# Patient Record
Sex: FEMALE | Race: WHITE | Employment: OTHER | URBAN - METROPOLITAN AREA
[De-identification: names, ages, dates, MRNs, and addresses within clinical notes are randomized per-mention and may not be internally consistent; named-entity substitution may affect disease eponyms.]

---

## 2024-05-11 NOTE — H&P
58 year old female presents with c/o Back pain.          SULFA: JOINT SWELLING         PMH:         ANKYLOSING SPONDYLITIS  Anxiety  Depression    Psh:  Acdf [id number]  Cholecsytectomy  Hysterectomy  Deviated nasal septum  Total right hip  Knee arthroscopy    Sh:  Neg etoh  Ext tobacco use  Neg substance abuse      This note was created using voice recognition software and was not corrected for typographical or grammatical errors and may have unintended errors             I spent 60 minutes with the patient with over 50% for direct face to face counseling and coordination of care. Risks, benefits and alternatives to surgery were discussed:         Risks including bleeding, infection, temporary or permanent numbness, tingling, weakness, paralysis, bowel or bladder dysfunction, leaking of cerebrospinal fluid leading to meningitis, no pain improvement or worsening of pain, future staged surgery, seizure, stroke, blindness, heart attack, blood clot in legs, pulmonary embolism, coma, death, or other. Risk of recurrent or residual symptoms and scar tissue formation which cannot be improved by further surgery.         [Spinal hardware and instrumentation risks include: hardware failure or breaking of screws or rods or plates requiring revision or removal.         Nonfusion or pseudoarthrosis risk is higher with diabetes or tobacco use and may cause long term pain and require future revision, removal or other surgery.]         [Staged surgery a possibility including spinal cord stimulation for continuing pain in the back or legs after surgery.]         Benefit to include neurologic stabilization to that there is no further loss of function but without promise or guarantee of any improvement as any function already lost may be irreversible from longstanding injury or an injury that was too severe to allow recovery.         The goal of surgery would be to prevent you from becoming any worse and losing even more neurological

## 2024-05-13 LAB — NON-UH HIE PLATELET FUNCTION (ASPIRIN): 643 ARU

## 2024-05-15 RX ORDER — NABUMETONE 500 MG/1
500 TABLET, FILM COATED ORAL 2 TIMES DAILY
Status: ON HOLD | COMMUNITY
Start: 2021-07-12 | End: 2024-05-20 | Stop reason: HOSPADM

## 2024-05-15 RX ORDER — HYDROCODONE BITARTRATE AND ACETAMINOPHEN 5; 325 MG/1; MG/1
1 TABLET ORAL EVERY 12 HOURS
Status: ON HOLD | COMMUNITY
Start: 2024-01-02 | End: 2024-05-20 | Stop reason: HOSPADM

## 2024-05-15 RX ORDER — VENLAFAXINE 75 MG/1
75 TABLET ORAL DAILY
COMMUNITY
Start: 2023-12-21

## 2024-05-15 RX ORDER — PANTOPRAZOLE SODIUM 40 MG/1
40 TABLET, DELAYED RELEASE ORAL DAILY
COMMUNITY
Start: 2023-12-21

## 2024-05-15 RX ORDER — TRAZODONE HYDROCHLORIDE 150 MG/1
150 TABLET ORAL NIGHTLY
COMMUNITY
Start: 1993-02-22

## 2024-05-15 RX ORDER — CLONAZEPAM 0.5 MG/1
0.5 TABLET ORAL NIGHTLY PRN
COMMUNITY
Start: 2014-08-01 | End: 2024-05-26

## 2024-05-15 RX ORDER — LORATADINE 10 MG/1
10 TABLET ORAL DAILY
COMMUNITY
Start: 2024-01-02

## 2024-05-15 RX ORDER — ADALIMUMAB 40MG/0.4ML
KIT SUBCUTANEOUS
COMMUNITY
Start: 2023-12-12

## 2024-05-16 ENCOUNTER — ANESTHESIA EVENT (OUTPATIENT)
Dept: OPERATING ROOM | Age: 59
End: 2024-05-16
Payer: MEDICARE

## 2024-05-17 ENCOUNTER — HOSPITAL ENCOUNTER (INPATIENT)
Age: 59
LOS: 3 days | Discharge: HOME HEALTH | DRG: 455 | End: 2024-05-20
Attending: NEUROLOGICAL SURGERY | Admitting: NEUROLOGICAL SURGERY
Payer: MEDICARE

## 2024-05-17 ENCOUNTER — ANESTHESIA (OUTPATIENT)
Dept: OPERATING ROOM | Age: 59
End: 2024-05-17
Payer: MEDICARE

## 2024-05-17 ENCOUNTER — APPOINTMENT (OUTPATIENT)
Dept: GENERAL RADIOLOGY | Age: 59
DRG: 455 | End: 2024-05-17
Attending: NEUROLOGICAL SURGERY
Payer: MEDICARE

## 2024-05-17 DIAGNOSIS — M48.062 SPINAL STENOSIS OF LUMBAR REGION WITH NEUROGENIC CLAUDICATION: Primary | ICD-10-CM

## 2024-05-17 PROBLEM — M48.00 SPINAL STENOSIS: Status: ACTIVE | Noted: 2024-05-17

## 2024-05-17 LAB
ABO + RH BLD: NORMAL
BLD GP AB SCN SERPL QL: NORMAL
BLOOD GROUP ANTIBODIES SERPL: NORMAL
BLOOD GROUP ANTIBODIES SERPL: NORMAL
C (LITTLE) ANTIGEN: NORMAL
E (BIG) ANTIGEN: NORMAL

## 2024-05-17 PROCEDURE — 2720000010 HC SURG SUPPLY STERILE: Performed by: NEUROLOGICAL SURGERY

## 2024-05-17 PROCEDURE — 6360000002 HC RX W HCPCS: Performed by: NEUROLOGICAL SURGERY

## 2024-05-17 PROCEDURE — 86922 COMPATIBILITY TEST ANTIGLOB: CPT

## 2024-05-17 PROCEDURE — 01NR0ZZ RELEASE SACRAL NERVE, OPEN APPROACH: ICD-10-PCS | Performed by: NEUROLOGICAL SURGERY

## 2024-05-17 PROCEDURE — 86905 BLOOD TYPING RBC ANTIGENS: CPT

## 2024-05-17 PROCEDURE — 2580000003 HC RX 258: Performed by: ANESTHESIOLOGY

## 2024-05-17 PROCEDURE — 2709999900 HC NON-CHARGEABLE SUPPLY: Performed by: NEUROLOGICAL SURGERY

## 2024-05-17 PROCEDURE — 3600000005 HC SURGERY LEVEL 5 BASE: Performed by: NEUROLOGICAL SURGERY

## 2024-05-17 PROCEDURE — 2780000010 HC IMPLANT OTHER: Performed by: NEUROLOGICAL SURGERY

## 2024-05-17 PROCEDURE — 1210000000 HC MED SURG R&B

## 2024-05-17 PROCEDURE — C1821 INTERSPINOUS IMPLANT: HCPCS | Performed by: NEUROLOGICAL SURGERY

## 2024-05-17 PROCEDURE — 7100000001 HC PACU RECOVERY - ADDTL 15 MIN: Performed by: NEUROLOGICAL SURGERY

## 2024-05-17 PROCEDURE — 3700000000 HC ANESTHESIA ATTENDED CARE: Performed by: NEUROLOGICAL SURGERY

## 2024-05-17 PROCEDURE — 86850 RBC ANTIBODY SCREEN: CPT

## 2024-05-17 PROCEDURE — 01NB0ZZ RELEASE LUMBAR NERVE, OPEN APPROACH: ICD-10-PCS | Performed by: NEUROLOGICAL SURGERY

## 2024-05-17 PROCEDURE — 2580000003 HC RX 258: Performed by: NEUROLOGICAL SURGERY

## 2024-05-17 PROCEDURE — 3700000001 HC ADD 15 MINUTES (ANESTHESIA): Performed by: NEUROLOGICAL SURGERY

## 2024-05-17 PROCEDURE — 0SG30AJ FUSION OF LUMBOSACRAL JOINT WITH INTERBODY FUSION DEVICE, POSTERIOR APPROACH, ANTERIOR COLUMN, OPEN APPROACH: ICD-10-PCS | Performed by: NEUROLOGICAL SURGERY

## 2024-05-17 PROCEDURE — 0ST40ZZ RESECTION OF LUMBOSACRAL DISC, OPEN APPROACH: ICD-10-PCS | Performed by: NEUROLOGICAL SURGERY

## 2024-05-17 PROCEDURE — 0SG00AJ FUSION OF LUMBAR VERTEBRAL JOINT WITH INTERBODY FUSION DEVICE, POSTERIOR APPROACH, ANTERIOR COLUMN, OPEN APPROACH: ICD-10-PCS | Performed by: NEUROLOGICAL SURGERY

## 2024-05-17 PROCEDURE — C1713 ANCHOR/SCREW BN/BN,TIS/BN: HCPCS | Performed by: NEUROLOGICAL SURGERY

## 2024-05-17 PROCEDURE — 6360000002 HC RX W HCPCS: Performed by: ANESTHESIOLOGY

## 2024-05-17 PROCEDURE — 86900 BLOOD TYPING SEROLOGIC ABO: CPT

## 2024-05-17 PROCEDURE — 2500000003 HC RX 250 WO HCPCS

## 2024-05-17 PROCEDURE — A4217 STERILE WATER/SALINE, 500 ML: HCPCS | Performed by: NEUROLOGICAL SURGERY

## 2024-05-17 PROCEDURE — 86870 RBC ANTIBODY IDENTIFICATION: CPT

## 2024-05-17 PROCEDURE — 0ST20ZZ RESECTION OF LUMBAR VERTEBRAL DISC, OPEN APPROACH: ICD-10-PCS | Performed by: NEUROLOGICAL SURGERY

## 2024-05-17 PROCEDURE — 7100000000 HC PACU RECOVERY - FIRST 15 MIN: Performed by: NEUROLOGICAL SURGERY

## 2024-05-17 PROCEDURE — 6360000002 HC RX W HCPCS

## 2024-05-17 PROCEDURE — 3600000015 HC SURGERY LEVEL 5 ADDTL 15MIN: Performed by: NEUROLOGICAL SURGERY

## 2024-05-17 PROCEDURE — 0SG0071 FUSION OF LUMBAR VERTEBRAL JOINT WITH AUTOLOGOUS TISSUE SUBSTITUTE, POSTERIOR APPROACH, POSTERIOR COLUMN, OPEN APPROACH: ICD-10-PCS | Performed by: NEUROLOGICAL SURGERY

## 2024-05-17 PROCEDURE — 6370000000 HC RX 637 (ALT 250 FOR IP): Performed by: NEUROLOGICAL SURGERY

## 2024-05-17 PROCEDURE — 2500000003 HC RX 250 WO HCPCS: Performed by: NEUROLOGICAL SURGERY

## 2024-05-17 PROCEDURE — 94150 VITAL CAPACITY TEST: CPT

## 2024-05-17 PROCEDURE — 6370000000 HC RX 637 (ALT 250 FOR IP)

## 2024-05-17 PROCEDURE — 0SG3071 FUSION OF LUMBOSACRAL JOINT WITH AUTOLOGOUS TISSUE SUBSTITUTE, POSTERIOR APPROACH, POSTERIOR COLUMN, OPEN APPROACH: ICD-10-PCS | Performed by: NEUROLOGICAL SURGERY

## 2024-05-17 PROCEDURE — 86901 BLOOD TYPING SEROLOGIC RH(D): CPT

## 2024-05-17 DEVICE — CALIBER SPACER 12 X 26MM, 9-13MM, 12&DEG;, LORDOTIC
Type: IMPLANTABLE DEVICE | Site: SPINE LUMBAR | Status: FUNCTIONAL
Brand: CALIBER

## 2024-05-17 DEVICE — CREO MIS 5.5MM CURVED ROD, TITANIUM ALLOY, 60MM LENGTH
Type: IMPLANTABLE DEVICE | Site: SPINE LUMBAR | Status: FUNCTIONAL
Brand: CREO

## 2024-05-17 DEVICE — CREO MIS MODULAR POLYAXIAL TULIP, 30MM REDUCTION
Type: IMPLANTABLE DEVICE | Site: SPINE LUMBAR | Status: FUNCTIONAL
Brand: CREO

## 2024-05-17 DEVICE — GRAFT HUM TISS L 10ML BONE MTRX CELLULAR OSTEOCEL PRO: Type: IMPLANTABLE DEVICE | Site: SPINE LUMBAR | Status: FUNCTIONAL

## 2024-05-17 DEVICE — CREO MIS LOCKING CAP
Type: IMPLANTABLE DEVICE | Site: SPINE LUMBAR | Status: FUNCTIONAL
Brand: CREO

## 2024-05-17 DEVICE — CREO MIS 5.5MM CURVED ROD, TITANIUM ALLOY, 70MM LENGTH
Type: IMPLANTABLE DEVICE | Site: SPINE LUMBAR | Status: FUNCTIONAL
Brand: CREO

## 2024-05-17 RX ORDER — MEPERIDINE HYDROCHLORIDE 25 MG/ML
12.5 INJECTION INTRAMUSCULAR; INTRAVENOUS; SUBCUTANEOUS EVERY 5 MIN PRN
Status: DISCONTINUED | OUTPATIENT
Start: 2024-05-17 | End: 2024-05-17 | Stop reason: HOSPADM

## 2024-05-17 RX ORDER — LABETALOL HYDROCHLORIDE 5 MG/ML
10 INJECTION, SOLUTION INTRAVENOUS
Status: DISCONTINUED | OUTPATIENT
Start: 2024-05-17 | End: 2024-05-17 | Stop reason: HOSPADM

## 2024-05-17 RX ORDER — CLONAZEPAM 0.5 MG/1
0.5 TABLET ORAL NIGHTLY PRN
Status: DISCONTINUED | OUTPATIENT
Start: 2024-05-17 | End: 2024-05-20 | Stop reason: HOSPADM

## 2024-05-17 RX ORDER — TRAZODONE HYDROCHLORIDE 150 MG/1
150 TABLET ORAL NIGHTLY
Status: DISCONTINUED | OUTPATIENT
Start: 2024-05-17 | End: 2024-05-20 | Stop reason: HOSPADM

## 2024-05-17 RX ORDER — GLUCAGON 1 MG/ML
1 KIT INJECTION PRN
Status: DISCONTINUED | OUTPATIENT
Start: 2024-05-17 | End: 2024-05-17 | Stop reason: HOSPADM

## 2024-05-17 RX ORDER — SODIUM CHLORIDE 9 MG/ML
INJECTION, SOLUTION INTRAVENOUS PRN
Status: DISCONTINUED | OUTPATIENT
Start: 2024-05-17 | End: 2024-05-17 | Stop reason: HOSPADM

## 2024-05-17 RX ORDER — METOCLOPRAMIDE HYDROCHLORIDE 5 MG/ML
10 INJECTION INTRAMUSCULAR; INTRAVENOUS
Status: DISCONTINUED | OUTPATIENT
Start: 2024-05-17 | End: 2024-05-17 | Stop reason: HOSPADM

## 2024-05-17 RX ORDER — CYCLOBENZAPRINE HCL 10 MG
10 TABLET ORAL EVERY 12 HOURS PRN
Status: DISCONTINUED | OUTPATIENT
Start: 2024-05-17 | End: 2024-05-20 | Stop reason: HOSPADM

## 2024-05-17 RX ORDER — SODIUM CHLORIDE 0.9 % (FLUSH) 0.9 %
5-40 SYRINGE (ML) INJECTION EVERY 12 HOURS SCHEDULED
Status: DISCONTINUED | OUTPATIENT
Start: 2024-05-17 | End: 2024-05-20 | Stop reason: HOSPADM

## 2024-05-17 RX ORDER — OXYCODONE HYDROCHLORIDE 5 MG/1
10 TABLET ORAL PRN
Status: DISCONTINUED | OUTPATIENT
Start: 2024-05-17 | End: 2024-05-17 | Stop reason: HOSPADM

## 2024-05-17 RX ORDER — ENOXAPARIN SODIUM 100 MG/ML
30 INJECTION SUBCUTANEOUS DAILY
Status: DISCONTINUED | OUTPATIENT
Start: 2024-05-18 | End: 2024-05-20 | Stop reason: HOSPADM

## 2024-05-17 RX ORDER — PANTOPRAZOLE SODIUM 40 MG/1
40 TABLET, DELAYED RELEASE ORAL DAILY
Status: DISCONTINUED | OUTPATIENT
Start: 2024-05-17 | End: 2024-05-20 | Stop reason: HOSPADM

## 2024-05-17 RX ORDER — VENLAFAXINE 75 MG/1
75 TABLET ORAL DAILY
Status: DISCONTINUED | OUTPATIENT
Start: 2024-05-17 | End: 2024-05-20 | Stop reason: HOSPADM

## 2024-05-17 RX ORDER — FENTANYL CITRATE 50 UG/ML
INJECTION, SOLUTION INTRAMUSCULAR; INTRAVENOUS PRN
Status: DISCONTINUED | OUTPATIENT
Start: 2024-05-17 | End: 2024-05-17 | Stop reason: SDUPTHER

## 2024-05-17 RX ORDER — SODIUM CHLORIDE 0.9 % (FLUSH) 0.9 %
5-40 SYRINGE (ML) INJECTION EVERY 12 HOURS SCHEDULED
Status: DISCONTINUED | OUTPATIENT
Start: 2024-05-17 | End: 2024-05-17 | Stop reason: HOSPADM

## 2024-05-17 RX ORDER — SODIUM CHLORIDE AND POTASSIUM CHLORIDE 150; 900 MG/100ML; MG/100ML
INJECTION, SOLUTION INTRAVENOUS CONTINUOUS
Status: DISCONTINUED | OUTPATIENT
Start: 2024-05-17 | End: 2024-05-20 | Stop reason: HOSPADM

## 2024-05-17 RX ORDER — SODIUM CHLORIDE 0.9 % (FLUSH) 0.9 %
5-40 SYRINGE (ML) INJECTION PRN
Status: DISCONTINUED | OUTPATIENT
Start: 2024-05-17 | End: 2024-05-20

## 2024-05-17 RX ORDER — SODIUM CHLORIDE 0.9 % (FLUSH) 0.9 %
5-40 SYRINGE (ML) INJECTION PRN
Status: DISCONTINUED | OUTPATIENT
Start: 2024-05-17 | End: 2024-05-17 | Stop reason: HOSPADM

## 2024-05-17 RX ORDER — IPRATROPIUM BROMIDE AND ALBUTEROL SULFATE 2.5; .5 MG/3ML; MG/3ML
1 SOLUTION RESPIRATORY (INHALATION)
Status: DISCONTINUED | OUTPATIENT
Start: 2024-05-17 | End: 2024-05-17 | Stop reason: HOSPADM

## 2024-05-17 RX ORDER — ONDANSETRON 2 MG/ML
4 INJECTION INTRAMUSCULAR; INTRAVENOUS
Status: DISCONTINUED | OUTPATIENT
Start: 2024-05-17 | End: 2024-05-17 | Stop reason: HOSPADM

## 2024-05-17 RX ORDER — FENTANYL CITRATE 0.05 MG/ML
25 INJECTION, SOLUTION INTRAMUSCULAR; INTRAVENOUS EVERY 5 MIN PRN
Status: COMPLETED | OUTPATIENT
Start: 2024-05-17 | End: 2024-05-17

## 2024-05-17 RX ORDER — ONDANSETRON 2 MG/ML
INJECTION INTRAMUSCULAR; INTRAVENOUS PRN
Status: DISCONTINUED | OUTPATIENT
Start: 2024-05-17 | End: 2024-05-17 | Stop reason: SDUPTHER

## 2024-05-17 RX ORDER — BISACODYL 10 MG
10 SUPPOSITORY, RECTAL RECTAL DAILY PRN
Status: DISCONTINUED | OUTPATIENT
Start: 2024-05-17 | End: 2024-05-18

## 2024-05-17 RX ORDER — EPHEDRINE SULFATE/0.9% NACL/PF 25 MG/5 ML
SYRINGE (ML) INTRAVENOUS PRN
Status: DISCONTINUED | OUTPATIENT
Start: 2024-05-17 | End: 2024-05-17 | Stop reason: SDUPTHER

## 2024-05-17 RX ORDER — LIDOCAINE HYDROCHLORIDE 10 MG/ML
1 INJECTION, SOLUTION EPIDURAL; INFILTRATION; INTRACAUDAL; PERINEURAL
Status: DISCONTINUED | OUTPATIENT
Start: 2024-05-17 | End: 2024-05-17 | Stop reason: HOSPADM

## 2024-05-17 RX ORDER — PROPOFOL 10 MG/ML
INJECTION, EMULSION INTRAVENOUS PRN
Status: DISCONTINUED | OUTPATIENT
Start: 2024-05-17 | End: 2024-05-17 | Stop reason: SDUPTHER

## 2024-05-17 RX ORDER — ACETAMINOPHEN 325 MG/1
650 TABLET ORAL EVERY 6 HOURS
Status: DISCONTINUED | OUTPATIENT
Start: 2024-05-17 | End: 2024-05-20 | Stop reason: HOSPADM

## 2024-05-17 RX ORDER — ROCURONIUM BROMIDE 10 MG/ML
INJECTION, SOLUTION INTRAVENOUS PRN
Status: DISCONTINUED | OUTPATIENT
Start: 2024-05-17 | End: 2024-05-17 | Stop reason: SDUPTHER

## 2024-05-17 RX ORDER — GINSENG 100 MG
CAPSULE ORAL PRN
Status: DISCONTINUED | OUTPATIENT
Start: 2024-05-17 | End: 2024-05-17 | Stop reason: HOSPADM

## 2024-05-17 RX ORDER — POLYETHYLENE GLYCOL 3350 17 G/17G
17 POWDER, FOR SOLUTION ORAL DAILY PRN
Status: DISCONTINUED | OUTPATIENT
Start: 2024-05-17 | End: 2024-05-18

## 2024-05-17 RX ORDER — OXYCODONE HYDROCHLORIDE 5 MG/1
5 TABLET ORAL EVERY 4 HOURS PRN
Status: DISCONTINUED | OUTPATIENT
Start: 2024-05-17 | End: 2024-05-20 | Stop reason: HOSPADM

## 2024-05-17 RX ORDER — NALOXONE HYDROCHLORIDE 0.4 MG/ML
INJECTION, SOLUTION INTRAMUSCULAR; INTRAVENOUS; SUBCUTANEOUS PRN
Status: DISCONTINUED | OUTPATIENT
Start: 2024-05-17 | End: 2024-05-18

## 2024-05-17 RX ORDER — HYDROMORPHONE HYDROCHLORIDE 1 MG/ML
1 INJECTION, SOLUTION INTRAMUSCULAR; INTRAVENOUS; SUBCUTANEOUS
Status: DISCONTINUED | OUTPATIENT
Start: 2024-05-17 | End: 2024-05-20 | Stop reason: HOSPADM

## 2024-05-17 RX ORDER — DEXAMETHASONE SODIUM PHOSPHATE 10 MG/ML
INJECTION INTRAMUSCULAR; INTRAVENOUS PRN
Status: DISCONTINUED | OUTPATIENT
Start: 2024-05-17 | End: 2024-05-17 | Stop reason: SDUPTHER

## 2024-05-17 RX ORDER — MAGNESIUM HYDROXIDE 1200 MG/15ML
LIQUID ORAL CONTINUOUS PRN
Status: DISCONTINUED | OUTPATIENT
Start: 2024-05-17 | End: 2024-05-17 | Stop reason: HOSPADM

## 2024-05-17 RX ORDER — GLYCOPYRROLATE 0.2 MG/ML
INJECTION INTRAMUSCULAR; INTRAVENOUS PRN
Status: DISCONTINUED | OUTPATIENT
Start: 2024-05-17 | End: 2024-05-17 | Stop reason: SDUPTHER

## 2024-05-17 RX ORDER — SODIUM CHLORIDE, SODIUM LACTATE, POTASSIUM CHLORIDE, CALCIUM CHLORIDE 600; 310; 30; 20 MG/100ML; MG/100ML; MG/100ML; MG/100ML
INJECTION, SOLUTION INTRAVENOUS CONTINUOUS
Status: DISCONTINUED | OUTPATIENT
Start: 2024-05-17 | End: 2024-05-17 | Stop reason: HOSPADM

## 2024-05-17 RX ORDER — NALOXONE HYDROCHLORIDE 0.4 MG/ML
INJECTION, SOLUTION INTRAMUSCULAR; INTRAVENOUS; SUBCUTANEOUS PRN
Status: DISCONTINUED | OUTPATIENT
Start: 2024-05-17 | End: 2024-05-17 | Stop reason: HOSPADM

## 2024-05-17 RX ORDER — LIDOCAINE HYDROCHLORIDE 10 MG/ML
INJECTION, SOLUTION EPIDURAL; INFILTRATION; INTRACAUDAL; PERINEURAL PRN
Status: DISCONTINUED | OUTPATIENT
Start: 2024-05-17 | End: 2024-05-17 | Stop reason: SDUPTHER

## 2024-05-17 RX ORDER — MIDAZOLAM HYDROCHLORIDE 1 MG/ML
INJECTION INTRAMUSCULAR; INTRAVENOUS PRN
Status: DISCONTINUED | OUTPATIENT
Start: 2024-05-17 | End: 2024-05-17 | Stop reason: SDUPTHER

## 2024-05-17 RX ORDER — REMIFENTANIL HYDROCHLORIDE 1 MG/ML
INJECTION, POWDER, LYOPHILIZED, FOR SOLUTION INTRAVENOUS CONTINUOUS PRN
Status: DISCONTINUED | OUTPATIENT
Start: 2024-05-17 | End: 2024-05-17 | Stop reason: SDUPTHER

## 2024-05-17 RX ORDER — METHOCARBAMOL 500 MG/1
1000 TABLET, FILM COATED ORAL 4 TIMES DAILY
Status: DISCONTINUED | OUTPATIENT
Start: 2024-05-17 | End: 2024-05-20 | Stop reason: HOSPADM

## 2024-05-17 RX ORDER — CETIRIZINE HYDROCHLORIDE 10 MG/1
10 TABLET ORAL DAILY
Status: DISCONTINUED | OUTPATIENT
Start: 2024-05-17 | End: 2024-05-20 | Stop reason: HOSPADM

## 2024-05-17 RX ORDER — VANCOMYCIN HYDROCHLORIDE 1 G/20ML
INJECTION, POWDER, LYOPHILIZED, FOR SOLUTION INTRAVENOUS PRN
Status: DISCONTINUED | OUTPATIENT
Start: 2024-05-17 | End: 2024-05-17 | Stop reason: HOSPADM

## 2024-05-17 RX ORDER — HYDRALAZINE HYDROCHLORIDE 20 MG/ML
10 INJECTION INTRAMUSCULAR; INTRAVENOUS
Status: DISCONTINUED | OUTPATIENT
Start: 2024-05-17 | End: 2024-05-17 | Stop reason: HOSPADM

## 2024-05-17 RX ORDER — SODIUM CHLORIDE 9 MG/ML
INJECTION, SOLUTION INTRAVENOUS PRN
Status: DISCONTINUED | OUTPATIENT
Start: 2024-05-17 | End: 2024-05-20

## 2024-05-17 RX ORDER — DEXTROSE MONOHYDRATE 100 MG/ML
INJECTION, SOLUTION INTRAVENOUS CONTINUOUS PRN
Status: DISCONTINUED | OUTPATIENT
Start: 2024-05-17 | End: 2024-05-17 | Stop reason: HOSPADM

## 2024-05-17 RX ORDER — OXYCODONE HYDROCHLORIDE 5 MG/1
5 TABLET ORAL PRN
Status: DISCONTINUED | OUTPATIENT
Start: 2024-05-17 | End: 2024-05-17 | Stop reason: HOSPADM

## 2024-05-17 RX ADMIN — LIDOCAINE HYDROCHLORIDE 40 MG: 10 INJECTION, SOLUTION EPIDURAL; INFILTRATION; INTRACAUDAL; PERINEURAL at 07:39

## 2024-05-17 RX ADMIN — SODIUM CHLORIDE, POTASSIUM CHLORIDE, SODIUM LACTATE AND CALCIUM CHLORIDE: 600; 310; 30; 20 INJECTION, SOLUTION INTRAVENOUS at 10:51

## 2024-05-17 RX ADMIN — PHENYLEPHRINE HYDROCHLORIDE 100 MCG: 10 INJECTION INTRAVENOUS at 07:54

## 2024-05-17 RX ADMIN — ONDANSETRON 4 MG: 2 INJECTION INTRAMUSCULAR; INTRAVENOUS at 11:34

## 2024-05-17 RX ADMIN — CEFAZOLIN 2000 MG: 2 INJECTION, POWDER, FOR SOLUTION INTRAMUSCULAR; INTRAVENOUS at 07:45

## 2024-05-17 RX ADMIN — METHOCARBAMOL TABLETS 1000 MG: 500 TABLET, COATED ORAL at 20:13

## 2024-05-17 RX ADMIN — CEFAZOLIN 2000 MG: 2 INJECTION, POWDER, FOR SOLUTION INTRAMUSCULAR; INTRAVENOUS at 11:45

## 2024-05-17 RX ADMIN — MEPERIDINE HYDROCHLORIDE 12.5 MG: 25 INJECTION, SOLUTION INTRAMUSCULAR; INTRAVENOUS; SUBCUTANEOUS at 14:12

## 2024-05-17 RX ADMIN — EPHEDRINE SULFATE 10 MG: 5 INJECTION INTRAVENOUS at 08:20

## 2024-05-17 RX ADMIN — OXYCODONE 5 MG: 5 TABLET ORAL at 15:22

## 2024-05-17 RX ADMIN — Medication: at 13:29

## 2024-05-17 RX ADMIN — REMIFENTANIL HYDROCHLORIDE 0.1 MCG/KG/MIN: 1 INJECTION, POWDER, LYOPHILIZED, FOR SOLUTION INTRAVENOUS at 08:02

## 2024-05-17 RX ADMIN — PHENYLEPHRINE HYDROCHLORIDE 100 MCG: 10 INJECTION INTRAVENOUS at 11:36

## 2024-05-17 RX ADMIN — FENTANYL CITRATE 25 MCG: 50 INJECTION, SOLUTION INTRAMUSCULAR; INTRAVENOUS at 11:02

## 2024-05-17 RX ADMIN — FENTANYL CITRATE 25 MCG: 0.05 INJECTION, SOLUTION INTRAMUSCULAR; INTRAVENOUS at 13:10

## 2024-05-17 RX ADMIN — MIDAZOLAM HYDROCHLORIDE 2 MG: 1 INJECTION, SOLUTION INTRAMUSCULAR; INTRAVENOUS at 07:33

## 2024-05-17 RX ADMIN — FENTANYL CITRATE 25 MCG: 0.05 INJECTION, SOLUTION INTRAMUSCULAR; INTRAVENOUS at 12:57

## 2024-05-17 RX ADMIN — GLYCOPYRROLATE 0.2 MG: 0.2 INJECTION INTRAMUSCULAR; INTRAVENOUS at 08:11

## 2024-05-17 RX ADMIN — FENTANYL CITRATE 25 MCG: 50 INJECTION, SOLUTION INTRAMUSCULAR; INTRAVENOUS at 12:20

## 2024-05-17 RX ADMIN — SODIUM CHLORIDE: 9 INJECTION, SOLUTION INTRAVENOUS at 13:16

## 2024-05-17 RX ADMIN — ACETAMINOPHEN 650 MG: 325 TABLET ORAL at 15:22

## 2024-05-17 RX ADMIN — FENTANYL CITRATE 50 MCG: 50 INJECTION, SOLUTION INTRAMUSCULAR; INTRAVENOUS at 12:29

## 2024-05-17 RX ADMIN — DEXAMETHASONE SODIUM PHOSPHATE 10 MG: 10 INJECTION INTRAMUSCULAR; INTRAVENOUS at 07:46

## 2024-05-17 RX ADMIN — FENTANYL CITRATE 25 MCG: 50 INJECTION, SOLUTION INTRAMUSCULAR; INTRAVENOUS at 07:38

## 2024-05-17 RX ADMIN — CEFAZOLIN 2000 MG: 2 INJECTION, POWDER, FOR SOLUTION INTRAMUSCULAR; INTRAVENOUS at 20:39

## 2024-05-17 RX ADMIN — FENTANYL CITRATE 25 MCG: 50 INJECTION, SOLUTION INTRAMUSCULAR; INTRAVENOUS at 12:12

## 2024-05-17 RX ADMIN — HYDROMORPHONE HYDROCHLORIDE 0.5 MG: 1 INJECTION, SOLUTION INTRAMUSCULAR; INTRAVENOUS; SUBCUTANEOUS at 12:32

## 2024-05-17 RX ADMIN — PHENYLEPHRINE HYDROCHLORIDE 100 MCG: 10 INJECTION INTRAVENOUS at 08:24

## 2024-05-17 RX ADMIN — OXYCODONE 5 MG: 5 TABLET ORAL at 20:12

## 2024-05-17 RX ADMIN — EPHEDRINE SULFATE 5 MG: 5 INJECTION INTRAVENOUS at 08:22

## 2024-05-17 RX ADMIN — FENTANYL CITRATE 25 MCG: 0.05 INJECTION, SOLUTION INTRAMUSCULAR; INTRAVENOUS at 13:16

## 2024-05-17 RX ADMIN — PHENYLEPHRINE HYDROCHLORIDE 100 MCG: 10 INJECTION INTRAVENOUS at 08:51

## 2024-05-17 RX ADMIN — POTASSIUM CHLORIDE AND SODIUM CHLORIDE: 900; 150 INJECTION, SOLUTION INTRAVENOUS at 13:28

## 2024-05-17 RX ADMIN — SODIUM CHLORIDE, POTASSIUM CHLORIDE, SODIUM LACTATE AND CALCIUM CHLORIDE: 600; 310; 30; 20 INJECTION, SOLUTION INTRAVENOUS at 06:33

## 2024-05-17 RX ADMIN — FENTANYL CITRATE 25 MCG: 50 INJECTION, SOLUTION INTRAMUSCULAR; INTRAVENOUS at 09:55

## 2024-05-17 RX ADMIN — PHENYLEPHRINE HYDROCHLORIDE 100 MCG: 10 INJECTION INTRAVENOUS at 07:47

## 2024-05-17 RX ADMIN — ROCURONIUM BROMIDE 50 MG: 10 INJECTION, SOLUTION INTRAVENOUS at 07:40

## 2024-05-17 RX ADMIN — FENTANYL CITRATE 25 MCG: 50 INJECTION, SOLUTION INTRAMUSCULAR; INTRAVENOUS at 10:40

## 2024-05-17 RX ADMIN — FENTANYL CITRATE 25 MCG: 0.05 INJECTION, SOLUTION INTRAMUSCULAR; INTRAVENOUS at 13:02

## 2024-05-17 RX ADMIN — EPHEDRINE SULFATE 5 MG: 5 INJECTION INTRAVENOUS at 08:44

## 2024-05-17 RX ADMIN — HYDROMORPHONE HYDROCHLORIDE 0.5 MG: 1 INJECTION, SOLUTION INTRAMUSCULAR; INTRAVENOUS; SUBCUTANEOUS at 12:46

## 2024-05-17 RX ADMIN — PROPOFOL 140 MG: 10 INJECTION, EMULSION INTRAVENOUS at 07:39

## 2024-05-17 ASSESSMENT — PAIN SCALES - GENERAL
PAINLEVEL_OUTOF10: 6
PAINLEVEL_OUTOF10: 6
PAINLEVEL_OUTOF10: 7
PAINLEVEL_OUTOF10: 10
PAINLEVEL_OUTOF10: 8
PAINLEVEL_OUTOF10: 9
PAINLEVEL_OUTOF10: 10
PAINLEVEL_OUTOF10: 7
PAINLEVEL_OUTOF10: 10
PAINLEVEL_OUTOF10: 9
PAINLEVEL_OUTOF10: 10
PAINLEVEL_OUTOF10: 10

## 2024-05-17 ASSESSMENT — PAIN DESCRIPTION - LOCATION
LOCATION: BACK;HIP;LEG
LOCATION: BACK;LEG
LOCATION: BACK;LEG
LOCATION: BACK;HIP;LEG
LOCATION: BACK;HIP;LEG
LOCATION: BACK
LOCATION: LEG;BACK
LOCATION: BACK
LOCATION: LEG;BACK
LOCATION: BACK
LOCATION: BACK;HIP;LEG

## 2024-05-17 ASSESSMENT — PAIN DESCRIPTION - DESCRIPTORS
DESCRIPTORS: BURNING
DESCRIPTORS: SPASM
DESCRIPTORS: BURNING
DESCRIPTORS: SPASM
DESCRIPTORS: ACHING
DESCRIPTORS: ACHING
DESCRIPTORS: SHOOTING
DESCRIPTORS: BURNING
DESCRIPTORS: BURNING;SHOOTING;THROBBING

## 2024-05-17 ASSESSMENT — PAIN DESCRIPTION - ORIENTATION
ORIENTATION: LEFT
ORIENTATION: RIGHT
ORIENTATION: LEFT
ORIENTATION: RIGHT
ORIENTATION: LEFT
ORIENTATION: LEFT

## 2024-05-17 ASSESSMENT — PAIN - FUNCTIONAL ASSESSMENT: PAIN_FUNCTIONAL_ASSESSMENT: 0-10

## 2024-05-17 NOTE — OP NOTE
Operative Note      Patient: Sandra Paris  YOB: 1965  MRN: [id number]    Date of Procedure: 5/17/2024    Pre-Op Diagnosis Codes:     * Acute back pain with sciatica, unspecified laterality [M54.40]    Post-Op Diagnosis: Same       Procedure(s):  LUMBAR 4-5 AND LUMBAR 5 SACRAL 1, TRANSFORAMINAL LUMBAR INTERBODY FUSION,INSERTION PEDICLE SCREWS AND WINSTON, AND INTERBODY CAGES WITH AUTOGRAFT, LAMINECTOMY,FACETECTOMY, FORAMINOTOMY AND INDICATED PROCEDURES.    Surgeon(s):  Inocencio Lerma MD    Assistant:   First Assistant: Xenia Garg  Physician Assistant: Hillary Mohamud PA-C    Anesthesia: General    Estimated Blood Loss (mL): 50ml    Complications: None    Specimens:   * No specimens in log *    Implants:  Implant Name Type Inv. Item Serial No.  Lot No. LRB No. Used Action   GRAFT HUM TISS L 10ML BONE MTRX CELLULAR OSTEOCEL PRO - [id number]  GRAFT HUM TISS L 10ML BONE MTRX CELLULAR OSTEOCEL PRO [id number] [employer]-WD  N/A 1 Implanted   GRAFT HUM TISS L 10ML BONE MTRX CELLULAR OSTEOCEL PRO - [id number]  GRAFT HUM TISS L 10ML BONE MTRX CELLULAR OSTEOCEL PRO [id number] [employer]-WD  N/A 1 Implanted   SPACER SPNL [id number] 12DEG LUM LORDTC SELF EXP - [id number]  SPACER SPNL [id number] 12DEG LUM LORDTC SELF EXP  [employer]WD  N/A 2 Implanted   CAP SPNL POST FACET JT LISA FOR INTEGR WINSTON REDUC CREO MIS - [id number]  CAP SPNL POST FACET JT LISA FOR INTEGR WINSTON REDUC CREO MIS  [employer]-WD  N/A 6 Implanted   IMPL SPINE MIS TULIP 30MM - [id number] Spine IMPL SPINE MIS TULIP 30MM  [employer]-PMM  N/A 6 Implanted   CREO AMP 8.5X40MM HA MODULAR CANNULATED SCREW     [id number] N/A 1 Implanted   WINSTON SPNL L70MM DIA5.5MM TI ALLOY CVD CREO MIS - [id number]  WINSTON SPNL L70MM DIA5.5MM TI ALLOY CVD CREO MIS  [employer]-WD  N/A 1 Implanted   WINSTON SPNL 5.5X60MM CVD TI CREO MIS - [id number]  WINSTON SPNL 5.5X60MM CVD TI CREO MIS  [employer] [facility]-WD  N/A 1

## 2024-05-17 NOTE — ANESTHESIA PRE PROCEDURE
Department of Anesthesiology  Preprocedure Note       Name:  Sandra Paris   Age:  58 y.o.  :  1965                                          MRN:  [id number]         Date:  2024      Surgeon: Surgeon(s):  Inocencio Lerma MD    Procedure: Procedure(s):  LUMBAR 4-5 AND LUMBAR 5 SACRAL 1, TRANSFORAMINAL LUMBAR INTERBODY FUSION,INSERTION PEDICLE SCREWS AND WINSTON, AND INTERBODY CAGES WITH AUTOGRAFT, ALLOGRAFT MICRODISSECTION FLUROSCOPY, SPINAL MONITORING, PREOPERATIVE STEROTACTIC PLANNING,LAMINECTOMY,FACETECTOMY, FORAMINOTOMY AND INDICATED PROCEDURES MICROSCOPE, SPINAL MONITORING,KIRSTIN GLOBUS, O-ARM NAVIGATION, 1 C-ARM VANCO POWDER, OSTEOCEL-evokes CONFIRMATION #: [id number] & Kirstin aware  & Cristina aware    Medications prior to admission:   Prior to Admission medications    Medication Sig Start Date End Date Taking? Authorizing Provider   HYDROcodone-acetaminophen (NORCO) 5-325 MG per tablet Take 1 tablet by mouth every 12 hours. 24 Yes Viky Dow MD   nabumetone (RELAFEN) 500 MG tablet Take 1 tablet by mouth 2 times daily 21  Yes ProviderViky MD   clonazePAM (KLONOPIN) 0.5 MG tablet Take 1 tablet by mouth nightly as needed. 14 Yes ProviderViky MD   vitamin D3 (CHOLECALCIFEROL) 125 MCG (5000 UT) TABS tablet Take 1 tablet by mouth once 24  Yes ProviderViky MD   adalimumab (HUMIRA, 2 PEN,) 40 MG/0.4ML PNKT  23  Yes ProviderViky MD   traZODone (DESYREL) 150 MG tablet Take 1 tablet by mouth nightly 1993  Yes ProviderViky MD   loratadine (CLARITIN) 10 MG tablet Take 1 tablet by mouth daily 24  Yes iVky Dow MD   pantoprazole (PROTONIX) 40 MG tablet Take 1 tablet by mouth daily 23  Yes Viky Dow MD   venlafaxine (EFFEXOR) 75 MG tablet Take 1 tablet by mouth daily 23  Yes Viky Dow MD       Current medications:    Current Facility-Administered Medications   Medication

## 2024-05-17 NOTE — ANESTHESIA POSTPROCEDURE EVALUATION
Department of Anesthesiology  Postprocedure Note    Patient: Sandra Paris  MRN: [id number]  YOB: 1965  Date of evaluation: 5/17/2024    Procedure Summary       Date: 05/17/24 Room / Location: [address]    Anesthesia Start: 0730 Anesthesia Stop: 1229    Procedure: LUMBAR 4-5 AND LUMBAR 5 SACRAL 1, TRANSFORAMINAL LUMBAR INTERBODY FUSION,INSERTION PEDICLE SCREWS AND WINSTON, AND INTERBODY CAGES WITH AUTOGRAFT, LAMINECTOMY,FACETECTOMY, FORAMINOTOMY AND INDICATED PROCEDURES. (Spine Lumbar) Diagnosis:       Acute back pain with sciatica, unspecified laterality      (Acute back pain with sciatica, unspecified laterality [M54.40])    Surgeons: Inocencio Lerma MD Responsible Provider: Gutierrez Calero MD    Anesthesia Type: general ASA Status: 2            Anesthesia Type: No value filed.    Teri Phase I:      Teri Phase II:      Anesthesia Post Evaluation    Patient location during evaluation: PACU  Patient participation: complete - patient participated  Level of consciousness: awake and alert  Airway patency: patent  Nausea & Vomiting: no nausea and no vomiting  Cardiovascular status: blood pressure returned to baseline and hemodynamically stable  Respiratory status: acceptable  Hydration status: euvolemic  Pain management: adequate        No notable events documented.

## 2024-05-17 NOTE — CARE COORDINATION
Case Management Assessment  Initial Evaluation    Date/Time of Evaluation: 5/17/2024 3:22 PM  Assessment Completed by: Laurie Willard RN    If patient is discharged prior to next notation, then this note serves as note for discharge by case management.    Patient Name: Sandra Paris                   YOB: 1965  Diagnosis: Acute back pain with sciatica, unspecified laterality [M54.40]  Spinal stenosis [M48.00]                   Date / Time: 5/17/2024  5:43 AM    Patient Admission Status: Inpatient   Readmission Risk (Low < 19, Mod (19-27), High > 27): No data recorded  Current PCP: Blas Zarate MD  PCP verified by CM? Yes    Chart Reviewed: Yes      History Provided by: Patient  Patient Orientation: Alert and Oriented, Person, Place, Situation, Self    Patient Cognition: Alert    Hospitalization in the last 30 days (Readmission):  No    If yes, Readmission Assessment in  Navigator will be completed.    Advance Directives:      Code Status: Full Code   Patient's Primary Decision Maker is: Legal Next of Kin      Discharge Planning:    Patient lives with: Spouse/Significant Other Type of Home: House  Primary Care Giver: Self  Patient Support Systems include: Spouse/Significant Other   Current Financial resources:    Current community resources:    Current services prior to admission: None            Current DME:              Type of Home Care services:  None    ADLS  Prior functional level: Independent in ADLs/IADLs  Current functional level: Independent in ADLs/IADLs    PT AM-PAC:   /24  OT AM-PAC:   /24    Family can provide assistance at DC: Yes  Would you like Case Management to discuss the discharge plan with any other family members/significant others, and if so, who? Yes (PT FIANCE)  Plans to Return to Present Housing: Yes  Other Identified Issues/Barriers to RETURNING to current housing: NO  Potential Assistance needed at discharge: N/A            Potential DME:    Patient expects to

## 2024-05-18 ENCOUNTER — APPOINTMENT (OUTPATIENT)
Dept: CT IMAGING | Age: 59
DRG: 455 | End: 2024-05-18
Attending: NEUROLOGICAL SURGERY
Payer: MEDICARE

## 2024-05-18 LAB
ANION GAP SERPL CALCULATED.3IONS-SCNC: 11 MEQ/L (ref 9–15)
BUN SERPL-MCNC: 11 MG/DL (ref 8–23)
CALCIUM SERPL-MCNC: 8.9 MG/DL (ref 8.5–9.9)
CHLORIDE SERPL-SCNC: 102 MEQ/L (ref 95–107)
CO2 SERPL-SCNC: 28 MEQ/L (ref 20–31)
CREAT SERPL-MCNC: 0.72 MG/DL (ref 0.5–0.9)
ERYTHROCYTE [DISTWIDTH] IN BLOOD BY AUTOMATED COUNT: 13.2 % (ref 11.5–14.5)
GLUCOSE SERPL-MCNC: 122 MG/DL (ref 70–99)
HCT VFR BLD AUTO: 26.3 % (ref 37–47)
HGB BLD-MCNC: 8.8 G/DL (ref 12–16)
MAGNESIUM SERPL-MCNC: 1 MG/DL (ref 1.7–2.4)
MCH RBC QN AUTO: 31.1 PG (ref 27–31.3)
MCHC RBC AUTO-ENTMCNC: 33.5 % (ref 33–37)
MCV RBC AUTO: 92.9 FL (ref 79.4–94.8)
PHOSPHATE SERPL-MCNC: 2.5 MG/DL (ref 2.3–4.8)
PLATELET # BLD AUTO: 156 K/UL (ref 130–400)
POTASSIUM SERPL-SCNC: 3.9 MEQ/L (ref 3.4–4.9)
RBC # BLD AUTO: 2.83 M/UL (ref 4.2–5.4)
SODIUM SERPL-SCNC: 141 MEQ/L (ref 135–144)
WBC # BLD AUTO: 12.4 K/UL (ref 4.8–10.8)

## 2024-05-18 PROCEDURE — 85027 COMPLETE CBC AUTOMATED: CPT

## 2024-05-18 PROCEDURE — 72131 CT LUMBAR SPINE W/O DYE: CPT

## 2024-05-18 PROCEDURE — 2580000003 HC RX 258: Performed by: NEUROLOGICAL SURGERY

## 2024-05-18 PROCEDURE — 97166 OT EVAL MOD COMPLEX 45 MIN: CPT

## 2024-05-18 PROCEDURE — 6370000000 HC RX 637 (ALT 250 FOR IP): Performed by: NEUROLOGICAL SURGERY

## 2024-05-18 PROCEDURE — 80048 BASIC METABOLIC PNL TOTAL CA: CPT

## 2024-05-18 PROCEDURE — 6360000002 HC RX W HCPCS: Performed by: NEUROLOGICAL SURGERY

## 2024-05-18 PROCEDURE — 84100 ASSAY OF PHOSPHORUS: CPT

## 2024-05-18 PROCEDURE — 83735 ASSAY OF MAGNESIUM: CPT

## 2024-05-18 PROCEDURE — 97162 PT EVAL MOD COMPLEX 30 MIN: CPT

## 2024-05-18 PROCEDURE — 2700000000 HC OXYGEN THERAPY PER DAY

## 2024-05-18 PROCEDURE — 1210000000 HC MED SURG R&B

## 2024-05-18 PROCEDURE — 36415 COLL VENOUS BLD VENIPUNCTURE: CPT

## 2024-05-18 RX ADMIN — TRAZODONE HYDROCHLORIDE 150 MG: 150 TABLET ORAL at 22:51

## 2024-05-18 RX ADMIN — SODIUM CHLORIDE, PRESERVATIVE FREE 10 ML: 5 INJECTION INTRAVENOUS at 08:27

## 2024-05-18 RX ADMIN — OXYCODONE 5 MG: 5 TABLET ORAL at 14:50

## 2024-05-18 RX ADMIN — ACETAMINOPHEN 650 MG: 325 TABLET ORAL at 05:36

## 2024-05-18 RX ADMIN — METHOCARBAMOL TABLETS 1000 MG: 500 TABLET, COATED ORAL at 18:23

## 2024-05-18 RX ADMIN — ACETAMINOPHEN 650 MG: 325 TABLET ORAL at 18:23

## 2024-05-18 RX ADMIN — ACETAMINOPHEN 650 MG: 325 TABLET ORAL at 12:01

## 2024-05-18 RX ADMIN — METHOCARBAMOL TABLETS 1000 MG: 500 TABLET, COATED ORAL at 13:39

## 2024-05-18 RX ADMIN — CLONAZEPAM 0.5 MG: 0.5 TABLET ORAL at 22:51

## 2024-05-18 RX ADMIN — METHOCARBAMOL TABLETS 1000 MG: 500 TABLET, COATED ORAL at 04:55

## 2024-05-18 RX ADMIN — PANTOPRAZOLE SODIUM 40 MG: 40 TABLET, DELAYED RELEASE ORAL at 09:07

## 2024-05-18 RX ADMIN — ACETAMINOPHEN 650 MG: 325 TABLET ORAL at 00:17

## 2024-05-18 RX ADMIN — POTASSIUM CHLORIDE AND SODIUM CHLORIDE: 900; 150 INJECTION, SOLUTION INTRAVENOUS at 03:25

## 2024-05-18 RX ADMIN — POTASSIUM CHLORIDE AND SODIUM CHLORIDE: 900; 150 INJECTION, SOLUTION INTRAVENOUS at 20:22

## 2024-05-18 RX ADMIN — OXYCODONE 5 MG: 5 TABLET ORAL at 09:43

## 2024-05-18 RX ADMIN — VENLAFAXINE HYDROCHLORIDE 75 MG: 75 TABLET ORAL at 08:27

## 2024-05-18 RX ADMIN — CEFAZOLIN 2000 MG: 2 INJECTION, POWDER, FOR SOLUTION INTRAMUSCULAR; INTRAVENOUS at 04:54

## 2024-05-18 RX ADMIN — OXYCODONE 5 MG: 5 TABLET ORAL at 20:20

## 2024-05-18 RX ADMIN — CETIRIZINE HYDROCHLORIDE 10 MG: 10 TABLET, FILM COATED ORAL at 09:43

## 2024-05-18 RX ADMIN — ENOXAPARIN SODIUM 30 MG: 100 INJECTION SUBCUTANEOUS at 09:07

## 2024-05-18 RX ADMIN — HYDROMORPHONE HYDROCHLORIDE 1 MG: 1 INJECTION, SOLUTION INTRAMUSCULAR; INTRAVENOUS; SUBCUTANEOUS at 18:23

## 2024-05-18 RX ADMIN — HYDROMORPHONE HYDROCHLORIDE 1 MG: 1 INJECTION, SOLUTION INTRAMUSCULAR; INTRAVENOUS; SUBCUTANEOUS at 12:01

## 2024-05-18 ASSESSMENT — PAIN SCALES - GENERAL
PAINLEVEL_OUTOF10: 5
PAINLEVEL_OUTOF10: 4
PAINLEVEL_OUTOF10: 8
PAINLEVEL_OUTOF10: 7
PAINLEVEL_OUTOF10: 4
PAINLEVEL_OUTOF10: 8
PAINLEVEL_OUTOF10: 7
PAINLEVEL_OUTOF10: 5
PAINLEVEL_OUTOF10: 7

## 2024-05-18 ASSESSMENT — PAIN DESCRIPTION - LOCATION: LOCATION: BACK

## 2024-05-18 NOTE — CONSULTS
DEPARTMENT OF HOSPITAL MEDICINE    CONSULT NOTE    PATIENT NAME:  Sandra Paris    MRN:  [id number]  SERVICE DATE:  5/17/2024   SERVICE TIME:  6:37 PM    Primary Care Physician: Blas Zarate MD       ASSESSMENT AND PLAN  Principal Problem:    Spinal stenosis s/p L4-5 and L5-S1 decompression and fusion  Plan: care per primary including pain management and DVT ppx    Anxiety: continue home effexor, trazodone, and prn klonopin  GERD: continue home PPI    Plan of care discussed with: patient    SUBJECTIVE  Reason for Consult: Anxiety    HPI:  This is a 58 y.o. female who underwent elective L4-5 and L5-S1 decompression and fusion with neurosurgery for chronic back pain with radiculopathy. Pt resting comfortably in bed. Having some post-operative pain but in no acute distress. Dr. Lerma is at bedside as well. Home medications reviewed. Denies chest pain or shortness of breath.     PAST MEDICAL HISTORY:    Past Medical History:   Diagnosis Date    Ankylosing spondylitis (HCC)     Anxiety     Arthritis     Depression     GERD (gastroesophageal reflux disease)     History of blood transfusion     PTSD (post-traumatic stress disorder)     Squamous cell carcinoma in situ (SCCIS) of skin of face     Vitamin D deficiency      PAST SURGICAL HISTORY:    Past Surgical History:   Procedure Laterality Date    ANTERIOR CERVICAL DISCECTOMY W/ FUSION      C5-6 C 6-7    CHOLECYSTECTOMY      COLONOSCOPY      ENDOSCOPY, COLON, DIAGNOSTIC      HYSTERECTOMY (CERVIX STATUS UNKNOWN)      KNEE ARTHROSCOPY Bilateral     NASAL SEPTUM SURGERY      deviated septum repair    SKIN BIOPSY      TOTAL HIP ARTHROPLASTY Right      FAMILY HISTORY:  History reviewed. No pertinent family history.  SOCIAL HISTORY:    Social History     Socioeconomic History    Marital status: Life Partner     Spouse name: Not on file    Number of children: Not on file    Years of education: Not on file    Highest education level: Not on file   Occupational History

## 2024-05-19 LAB — MAGNESIUM SERPL-MCNC: 1.9 MG/DL (ref 1.7–2.4)

## 2024-05-19 PROCEDURE — 1210000000 HC MED SURG R&B

## 2024-05-19 PROCEDURE — 83735 ASSAY OF MAGNESIUM: CPT

## 2024-05-19 PROCEDURE — 2700000000 HC OXYGEN THERAPY PER DAY

## 2024-05-19 PROCEDURE — 36415 COLL VENOUS BLD VENIPUNCTURE: CPT

## 2024-05-19 PROCEDURE — 6360000002 HC RX W HCPCS: Performed by: NEUROLOGICAL SURGERY

## 2024-05-19 PROCEDURE — 2580000003 HC RX 258: Performed by: NEUROLOGICAL SURGERY

## 2024-05-19 PROCEDURE — 6370000000 HC RX 637 (ALT 250 FOR IP): Performed by: NEUROLOGICAL SURGERY

## 2024-05-19 RX ORDER — LACTULOSE 10 G/15ML
20 SOLUTION ORAL 3 TIMES DAILY
Status: DISCONTINUED | OUTPATIENT
Start: 2024-05-19 | End: 2024-05-20 | Stop reason: HOSPADM

## 2024-05-19 RX ADMIN — SODIUM CHLORIDE, PRESERVATIVE FREE 10 ML: 5 INJECTION INTRAVENOUS at 21:07

## 2024-05-19 RX ADMIN — OXYCODONE 5 MG: 5 TABLET ORAL at 06:52

## 2024-05-19 RX ADMIN — ACETAMINOPHEN 650 MG: 325 TABLET ORAL at 18:00

## 2024-05-19 RX ADMIN — HYDROMORPHONE HYDROCHLORIDE 1 MG: 1 INJECTION, SOLUTION INTRAMUSCULAR; INTRAVENOUS; SUBCUTANEOUS at 03:08

## 2024-05-19 RX ADMIN — LACTULOSE 20 G: 20 SOLUTION ORAL at 21:06

## 2024-05-19 RX ADMIN — LACTULOSE 20 G: 20 SOLUTION ORAL at 13:54

## 2024-05-19 RX ADMIN — VENLAFAXINE HYDROCHLORIDE 75 MG: 75 TABLET ORAL at 09:18

## 2024-05-19 RX ADMIN — POTASSIUM CHLORIDE AND SODIUM CHLORIDE: 900; 150 INJECTION, SOLUTION INTRAVENOUS at 13:01

## 2024-05-19 RX ADMIN — METHOCARBAMOL TABLETS 1000 MG: 500 TABLET, COATED ORAL at 09:18

## 2024-05-19 RX ADMIN — PANTOPRAZOLE SODIUM 40 MG: 40 TABLET, DELAYED RELEASE ORAL at 06:52

## 2024-05-19 RX ADMIN — ACETAMINOPHEN 650 MG: 325 TABLET ORAL at 13:54

## 2024-05-19 RX ADMIN — METHOCARBAMOL TABLETS 1000 MG: 500 TABLET, COATED ORAL at 00:19

## 2024-05-19 RX ADMIN — ACETAMINOPHEN 650 MG: 325 TABLET ORAL at 00:19

## 2024-05-19 RX ADMIN — OXYCODONE 5 MG: 5 TABLET ORAL at 00:19

## 2024-05-19 RX ADMIN — OXYCODONE 5 MG: 5 TABLET ORAL at 18:04

## 2024-05-19 RX ADMIN — METHOCARBAMOL TABLETS 1000 MG: 500 TABLET, COATED ORAL at 13:54

## 2024-05-19 RX ADMIN — SODIUM CHLORIDE, PRESERVATIVE FREE 10 ML: 5 INJECTION INTRAVENOUS at 09:18

## 2024-05-19 RX ADMIN — ENOXAPARIN SODIUM 30 MG: 100 INJECTION SUBCUTANEOUS at 09:18

## 2024-05-19 RX ADMIN — CETIRIZINE HYDROCHLORIDE 10 MG: 10 TABLET, FILM COATED ORAL at 09:18

## 2024-05-19 RX ADMIN — OXYCODONE 5 MG: 5 TABLET ORAL at 13:54

## 2024-05-19 RX ADMIN — METHOCARBAMOL TABLETS 1000 MG: 500 TABLET, COATED ORAL at 18:00

## 2024-05-19 RX ADMIN — TRAZODONE HYDROCHLORIDE 150 MG: 150 TABLET ORAL at 21:06

## 2024-05-19 RX ADMIN — ACETAMINOPHEN 650 MG: 325 TABLET ORAL at 06:52

## 2024-05-19 RX ADMIN — METHOCARBAMOL TABLETS 1000 MG: 500 TABLET, COATED ORAL at 21:06

## 2024-05-19 ASSESSMENT — PAIN DESCRIPTION - LOCATION
LOCATION: BACK

## 2024-05-19 ASSESSMENT — PAIN SCALES - GENERAL
PAINLEVEL_OUTOF10: 7
PAINLEVEL_OUTOF10: 5
PAINLEVEL_OUTOF10: 5
PAINLEVEL_OUTOF10: 6
PAINLEVEL_OUTOF10: 5
PAINLEVEL_OUTOF10: 7

## 2024-05-19 ASSESSMENT — PAIN DESCRIPTION - ORIENTATION
ORIENTATION: LOWER

## 2024-05-19 ASSESSMENT — PAIN DESCRIPTION - DESCRIPTORS
DESCRIPTORS: ACHING

## 2024-05-19 NOTE — CARE COORDINATION
CALL TO [facility] AND ALSO FAXED REFERRAL. THEY DO SERVICE THE PATIENT'S AREA. THEY WILL CALL BACK WITH DETERMINATION. Electronically signed by Shelia Smith RN on 5/19/2024 at 10:27 AM      [facility] DOES NOT SERVICE PATIENTS AREA. Electronically signed by Shelia Smith RN on 5/19/2024 at 10:14 AM      SPOKE WITH PATIENT AFTER PT/OT RECS AND PT IS AGREEABLE TO [facility]. FOC OFFERED. ORDER FAXED TO [facility]. WILL AWAIT ACCEPTANCE DETERMINATION. PT HOPES TO DC HOME TODAY.

## 2024-05-20 VITALS
TEMPERATURE: 98.4 F | DIASTOLIC BLOOD PRESSURE: 72 MMHG | OXYGEN SATURATION: 93 % | SYSTOLIC BLOOD PRESSURE: 127 MMHG | WEIGHT: 130 LBS | HEIGHT: 66 IN | HEART RATE: 127 BPM | RESPIRATION RATE: 18 BRPM | BODY MASS INDEX: 20.89 KG/M2

## 2024-05-20 LAB
ANION GAP SERPL CALCULATED.3IONS-SCNC: 9 MEQ/L (ref 9–15)
BUN SERPL-MCNC: 5 MG/DL (ref 8–23)
CALCIUM SERPL-MCNC: 8.9 MG/DL (ref 8.5–9.9)
CHLORIDE SERPL-SCNC: 104 MEQ/L (ref 95–107)
CO2 SERPL-SCNC: 28 MEQ/L (ref 20–31)
CREAT SERPL-MCNC: 0.49 MG/DL (ref 0.5–0.9)
GLUCOSE SERPL-MCNC: 109 MG/DL (ref 70–99)
MAGNESIUM SERPL-MCNC: 1.5 MG/DL (ref 1.7–2.4)
POTASSIUM SERPL-SCNC: 4 MEQ/L (ref 3.4–4.9)
SODIUM SERPL-SCNC: 141 MEQ/L (ref 135–144)

## 2024-05-20 PROCEDURE — 36415 COLL VENOUS BLD VENIPUNCTURE: CPT

## 2024-05-20 PROCEDURE — 2580000003 HC RX 258: Performed by: NEUROLOGICAL SURGERY

## 2024-05-20 PROCEDURE — 80048 BASIC METABOLIC PNL TOTAL CA: CPT

## 2024-05-20 PROCEDURE — 97535 SELF CARE MNGMENT TRAINING: CPT

## 2024-05-20 PROCEDURE — 2700000000 HC OXYGEN THERAPY PER DAY

## 2024-05-20 PROCEDURE — 97116 GAIT TRAINING THERAPY: CPT

## 2024-05-20 PROCEDURE — 6360000002 HC RX W HCPCS: Performed by: NEUROLOGICAL SURGERY

## 2024-05-20 PROCEDURE — 83735 ASSAY OF MAGNESIUM: CPT

## 2024-05-20 PROCEDURE — 6370000000 HC RX 637 (ALT 250 FOR IP): Performed by: NEUROLOGICAL SURGERY

## 2024-05-20 RX ORDER — METHOCARBAMOL 750 MG/1
750 TABLET, FILM COATED ORAL 3 TIMES DAILY
Qty: 90 TABLET | Refills: 0 | Status: SHIPPED | OUTPATIENT
Start: 2024-05-20 | End: 2024-06-19

## 2024-05-20 RX ORDER — OXYCODONE HYDROCHLORIDE AND ACETAMINOPHEN 5; 325 MG/1; MG/1
1 TABLET ORAL EVERY 8 HOURS PRN
Qty: 21 TABLET | Refills: 0 | Status: SHIPPED | OUTPATIENT
Start: 2024-05-20 | End: 2024-05-27

## 2024-05-20 RX ADMIN — CETIRIZINE HYDROCHLORIDE 10 MG: 10 TABLET, FILM COATED ORAL at 09:46

## 2024-05-20 RX ADMIN — PANTOPRAZOLE SODIUM 40 MG: 40 TABLET, DELAYED RELEASE ORAL at 03:31

## 2024-05-20 RX ADMIN — SODIUM CHLORIDE, PRESERVATIVE FREE 10 ML: 5 INJECTION INTRAVENOUS at 09:53

## 2024-05-20 RX ADMIN — ACETAMINOPHEN 650 MG: 325 TABLET ORAL at 13:18

## 2024-05-20 RX ADMIN — OXYCODONE 5 MG: 5 TABLET ORAL at 03:31

## 2024-05-20 RX ADMIN — OXYCODONE 5 MG: 5 TABLET ORAL at 13:16

## 2024-05-20 RX ADMIN — VENLAFAXINE HYDROCHLORIDE 75 MG: 75 TABLET ORAL at 09:48

## 2024-05-20 RX ADMIN — ACETAMINOPHEN 650 MG: 325 TABLET ORAL at 03:31

## 2024-05-20 RX ADMIN — ENOXAPARIN SODIUM 30 MG: 100 INJECTION SUBCUTANEOUS at 09:48

## 2024-05-20 RX ADMIN — METHOCARBAMOL TABLETS 1000 MG: 500 TABLET, COATED ORAL at 09:47

## 2024-05-20 ASSESSMENT — PAIN SCALES - GENERAL
PAINLEVEL_OUTOF10: 7
PAINLEVEL_OUTOF10: 6
PAINLEVEL_OUTOF10: 3
PAINLEVEL_OUTOF10: 4
PAINLEVEL_OUTOF10: 5

## 2024-05-20 ASSESSMENT — PAIN DESCRIPTION - ORIENTATION
ORIENTATION: LOWER;MID;LEFT
ORIENTATION: LOWER;MID

## 2024-05-20 ASSESSMENT — PAIN DESCRIPTION - DESCRIPTORS
DESCRIPTORS: ACHING;THROBBING
DESCRIPTORS: ACHING;THROBBING

## 2024-05-20 ASSESSMENT — PAIN DESCRIPTION - LOCATION
LOCATION: BACK
LOCATION: BACK;LEG

## 2024-05-20 NOTE — DISCHARGE INSTR - COC
Continuity of Care Form    Patient Name: Sandra Paris   :  1965  MRN:  [id number]    Admit date:  2024  Discharge date:  2024    Code Status Order: Full Code   Advance Directives:   Advance Care Flowsheet Documentation       Date/Time Healthcare Directive Type of Healthcare Directive Copy in Chart Healthcare Agent Appointed Healthcare Agent's Name Healthcare Agent's Phone Number    [phone] No, patient does not have an advance directive for healthcare treatment -- -- -- -- --            Admitting Physician:  Inocencio Lerma MD  PCP: Blas Zarate MD    Discharging Nurse: Floresita VÁSQUEZ  Discharging Hospital Unit/Room#: W266/W266-01  Discharging Unit Phone Number: [phone]    Emergency Contact:   Extended Emergency Contact Information  Primary Emergency Contact: Antoine Hester  Mobile Phone: [phone]  Relation: Domestic Partner    Past Surgical History:  Past Surgical History:   Procedure Laterality Date    ANTERIOR CERVICAL DISCECTOMY W/ FUSION      C5-6 C 6-7    CHOLECYSTECTOMY      COLONOSCOPY      ENDOSCOPY, COLON, DIAGNOSTIC      HYSTERECTOMY (CERVIX STATUS UNKNOWN)      KNEE ARTHROSCOPY Bilateral     NASAL SEPTUM SURGERY      deviated septum repair    SKIN BIOPSY      TOTAL HIP ARTHROPLASTY Right        Immunization History:   Immunization History   Administered Date(s) Administered    COVID-19, PFIZER GRAY top, DO NOT Dilute, (age 12 y+), IM, 30 mcg/0.3 mL 2022    COVID-19, PFIZER PURPLE top, DILUTE for use, (age 12 y+), 30mcg/0.3mL 2021, 2021       Active Problems:  Patient Active Problem List   Diagnosis Code    Spinal stenosis M48.00       Isolation/Infection:   Isolation            No Isolation          Patient Infection Status       None to display            Nurse Assessment:  Last Vital Signs: /72   Pulse (!) 127   Temp 98.4 °F (36.9 °C) (Oral)   Resp 18   Ht 1.676 m (5' 6\")   Wt 59 kg (130 lb)   SpO2 93%   BMI 20.98 kg/m²     Last documented

## 2024-05-20 NOTE — DISCHARGE INSTRUCTIONS
ok shower and remove dressing. no lift>5lbs wear brace when out of bed. keep incisions dry and clean

## 2024-05-20 NOTE — PLAN OF CARE
Problem: Discharge Planning  Goal: Discharge to home or other facility with appropriate resources  5/19/2024 2238 by Winnie Malave RN  Outcome: Progressing  5/19/2024 2238 by Winnie Malave RN  Outcome: Progressing  5/19/2024 1016 by Humza Evans RN  Outcome: Progressing     Problem: Pain  Goal: Verbalizes/displays adequate comfort level or baseline comfort level  5/19/2024 2238 by Winnie Malave RN  Outcome: Progressing  5/19/2024 2238 by Winnie Malave RN  Outcome: Progressing  5/19/2024 1016 by Humza Evans RN  Outcome: Progressing     Problem: Safety - Adult  Goal: Free from fall injury  5/19/2024 2238 by Winnie Malave RN  Outcome: Progressing  5/19/2024 2238 by Winnie Malave RN  Outcome: Progressing  5/19/2024 1016 by Humza Evans RN  Outcome: Progressing     Problem: Neurosensory - Adult  Goal: Achieves stable or improved neurological status  5/19/2024 2238 by Winnie Malave RN  Outcome: Progressing  5/19/2024 2238 by Winnie Malave RN  Outcome: Progressing  Goal: Achieves maximal functionality and self care  5/19/2024 2238 by Winnie Malave RN  Outcome: Progressing  5/19/2024 2238 by Winnie Malave RN  Outcome: Progressing     Problem: Respiratory - Adult  Goal: Achieves optimal ventilation and oxygenation  5/19/2024 2238 by Winnie Malave RN  Outcome: Progressing  5/19/2024 2238 by Winnie Malave RN  Outcome: Progressing     Problem: Skin/Tissue Integrity - Adult  Goal: Skin integrity remains intact  5/19/2024 2238 by Winnie Malave RN  Outcome: Progressing  5/19/2024 2238 by Winnie Malave RN  Outcome: Progressing  Goal: Incisions, wounds, or drain sites healing without S/S of infection  5/19/2024 2238 by Winnie Malave RN  Outcome: Progressing  5/19/2024 2238 by Winnie Malave RN  Outcome: Progressing  Goal: Oral mucous membranes remain intact  5/19/2024 2238 by Winnie Malave RN  Outcome: Progressing  5/19/2024 2238 by Winnie Malave RN  Outcome: Progressing

## 2024-05-20 NOTE — CARE COORDINATION
Met with pt at bedside. DC plan is home with [facility] that has been set up. Per nurse we were waiting on pt to have BM. Per pt she had BM today and is hopeful for dc.

## 2024-05-20 NOTE — DISCHARGE INSTR - DIET

## 2024-05-20 NOTE — DISCHARGE SUMMARY
tenderness   Rectal:    Normal tone ;guaiac negative stool   Extremities:   Extremities normal, atraumatic, no cyanosis or edema   Pulses:   2+ and symmetric all extremities   Skin:   Skin color, texture, turgor normal, no rashes or lesions   Lymph nodes:   Cervical, supraclavicular, and axillary nodes normal   Neurologic:   CNII-XII intact, normal strength, sensation and reflexes     throughout       Disposition: home    In process/preliminary results:  Outstanding Order Results       Date and Time Order Name Status Description    5/17/2024  6:23 AM PREPARE RBC (CROSSMATCH) Preliminary             Patient Instructions:   Current Discharge Medication List        START taking these medications    Details   oxyCODONE-acetaminophen (PERCOCET) 5-325 MG per tablet Take 1 tablet by mouth every 8 hours as needed for Pain for up to 7 days. Intended supply: 3 days. Take lowest dose possible to manage pain Max Daily Amount: 3 tablets  Qty: 21 tablet, Refills: 0    Comments: Reduce doses taken as pain becomes manageable  Associated Diagnoses: Spinal stenosis of lumbar region with neurogenic claudication      methocarbamol (ROBAXIN-750) 750 MG tablet Take 1 tablet by mouth 3 times daily  Qty: 90 tablet, Refills: 0           CONTINUE these medications which have NOT CHANGED    Details   clonazePAM (KLONOPIN) 0.5 MG tablet Take 1 tablet by mouth nightly as needed.      vitamin D3 (CHOLECALCIFEROL) 125 MCG (5000 UT) TABS tablet Take 1 tablet by mouth once      adalimumab (HUMIRA, 2 PEN,) 40 MG/0.4ML PNKT       traZODone (DESYREL) 150 MG tablet Take 1 tablet by mouth nightly      loratadine (CLARITIN) 10 MG tablet Take 1 tablet by mouth daily      pantoprazole (PROTONIX) 40 MG tablet Take 1 tablet by mouth daily      venlafaxine (EFFEXOR) 75 MG tablet Take 1 tablet by mouth daily           STOP taking these medications       HYDROcodone-acetaminophen (NORCO) 5-325 MG per tablet Comments:   Reason for Stopping:         nabumetone

## 2024-05-21 LAB
BLOOD BANK DISPENSE STATUS: NORMAL
BLOOD BANK DISPENSE STATUS: NORMAL
BLOOD BANK PRODUCT CODE: NORMAL
BLOOD BANK PRODUCT CODE: NORMAL
BPU ID: NORMAL
BPU ID: NORMAL
DESCRIPTION BLOOD BANK: NORMAL
DESCRIPTION BLOOD BANK: NORMAL

## 2024-05-21 NOTE — PROGRESS NOTES
Complete bed changed d/t Purewick malfunction. Pt able to stand at side of bed.   Dressings to lower back changed d/t bloody drainage. Blisters noted along b/l incisions.   Pt states numbness and tingling to b/l feet is unchanged since surgery. Pain 5/10 to incisions.   Still no BM, but pt states she is passing gas a lot.  
Discussed with nurse and I spoke with patient. She did get lactulose awaiting bowel movement. She did get brace and where went out of bed. Also appreciate  setting up home Health and hopefully I will see if she’ll need another midnight inpatient admission, Patient stay to continue therapy and management and medical management for bowel movement and social work for disposition home health. 
Hospitalist Progress Note      PCP: Blas Zarate MD    Date of Admission: 5/17/2024    Chief Complaint:    No chief complaint on file.    Subjective:  Patient denies fevers, chills, sweats, CP, SOB, diarrhea or burning micturition.  12 point ROS negative other than mentioned above     Medications:  Reviewed    Infusion Medications    0.9% NaCl with KCl 20 mEq Stopped (05/20/24 0330)     Scheduled Medications    lactulose  20 g Oral TID    magnesium sulfate  6,000 mg IntraVENous Once    cetirizine  10 mg Oral Daily    pantoprazole  40 mg Oral Daily    traZODone  150 mg Oral Nightly    venlafaxine  75 mg Oral Daily    sodium chloride flush  5-40 mL IntraVENous 2 times per day    acetaminophen  650 mg Oral Q6H    enoxaparin  30 mg SubCUTAneous Daily    methocarbamol  1,000 mg Oral 4x Daily     PRN Meds: clonazePAM, oxyCODONE, HYDROmorphone, cyclobenzaprine      Intake/Output Summary (Last 24 hours) at 5/20/2024 1511  Last data filed at 5/20/2024 1215  Gross per 24 hour   Intake --   Output 2850 ml   Net -2850 ml     Exam:    /72   Pulse (!) 127   Temp 98.4 °F (36.9 °C) (Oral)   Resp 18   Ht 1.676 m (5' 6\")   Wt 59 kg (130 lb)   SpO2 93%   BMI 20.98 kg/m²     General appearance: No apparent distress, appears stated age and cooperative.  HEENT:  Conjunctivae/corneas clear.  Neck: Trachea midline.  Respiratory:  Normal respiratory effort. Clear to auscultation  Cardiovascular: Regular rate and rhythm  Abdomen: Soft, non-tender, non-distended with normal bowel sounds.  Musculoskeletal: No clubbing, cyanosis or edema bilaterally  Neuro: Non Focal.   Capillary Refill: Brisk,< 3 seconds   Peripheral Pulses: +2 palpable, equal bilaterally     Labs:   Recent Labs     05/18/24  0445   WBC 12.4*   HGB 8.8*   HCT 26.3*        Recent Labs     05/18/24  0445 05/20/24  0605    141   K 3.9 4.0    104   CO2 28 28   BUN 11 5*   CREATININE 0.72 0.49*   CALCIUM 8.9 8.9   PHOS 2.5  --      No results for 
Message sent to Dr Lerma regarding the patient having a BM and wondering when she would be able to be discharged to home, awaiting a response  
Phone PAT completed, pt given pre-op instructions (per orange sheet) and verbalized understanding.  Electronically signed by Nya Alejandro RN on 5/15/2024 at 11:19 AM    
Physical Therapy  Facility/Department: [facility] MED SURG W266/W266-01  Physical Therapy Discharge      NAME: Sandra Paris    : 1965 (58 y.o.)  MRN: [id number]    Account: [id number]  Gender: female      Patient has been discharged from acute care hospital. DC patient from current PT program.      Electronically signed by Sheron Allen PT on 24 at 8:32 AM EDT    
Physical Therapy Med Surg Initial Assessment  Facility/Department: [address] ORTHO TELE  Room: [facility]/66-       NAME: Sandra Paris  : 1965 (58 y.o.)  MRN: [id number]  CODE STATUS: Full Code    Date of Service: 2024    Patient Diagnosis(es): Acute back pain with sciatica, unspecified laterality [M54.40]  Spinal stenosis [M48.00]   No chief complaint on file.    Patient Active Problem List    Diagnosis Date Noted    Spinal stenosis 2024      Past Medical History:   Diagnosis Date    Ankylosing spondylitis (HCC)     Anxiety     Arthritis     Depression     GERD (gastroesophageal reflux disease)     History of blood transfusion     PTSD (post-traumatic stress disorder)     Squamous cell carcinoma in situ (SCCIS) of skin of face     Vitamin D deficiency      Past Surgical History:   Procedure Laterality Date    ANTERIOR CERVICAL DISCECTOMY W/ FUSION      C5-6 C 6-7    CHOLECYSTECTOMY      COLONOSCOPY      ENDOSCOPY, COLON, DIAGNOSTIC      HYSTERECTOMY (CERVIX STATUS UNKNOWN)      KNEE ARTHROSCOPY Bilateral     NASAL SEPTUM SURGERY      deviated septum repair    SKIN BIOPSY      TOTAL HIP ARTHROPLASTY Right      Chart Reviewed: Yes  Patient assessed for rehabilitation services?: Yes  Family / Caregiver Present: No  Restrictions:  Restrictions/Precautions: Fall Risk  Position Activity Restriction  Other position/activity restrictions: limit excessive bending, twisting, lifting per clinical judgment d/t recent back surgery     SUBJECTIVE:   Subjective: Pt reports burning incisional pain    Pain  Pre treatment screenin/10 OR Faces 1-10  Location: back  Description:incisional  Onset/duration: acute  [x]  Pt declined intervention  [x]  Pt able to proceed PT session  []  RN or physician notified  []  RN called to bedside to administer meds.  Post treatment screenin/10, described as same as above     Prior Level of Function:  Social/Functional History  Lives With: Significant other  Type of Home: 
Pt alert and oriented in room.  Denies nausea.  Reports pain.  PCA pump being utilized.  Pt using IS, encouraged deep breathing and coughing.  Neuro check intact.  Pt reports no worsening in numbness to lower extremities.  Call light in reach. Bed alarm on. 0400:  Pt ranks pain tolerable at 4/10.  Alert and oriented.    
See OT evaluation for all goals and OT POC. Electronically signed by Farhad Parker OTR/L on 5/18/2024 at 11:22 AM   
Seen examined d/w rn and therapists in her rm  Walked into the hallway  Awake alert dc pca hl iv  Magnesium low 1 today appreciated hospitalist medical mgmt was replaced and recheck in am  Dill x 4 to commands  Incis pain  Inc activ diet as krishna pt ot pain mgmt prn  She needs another midnight as inpatient for pain control postop and therapy and reassess tomorrow  Postop ct reviewed stable hardware  
Seen examined in 266 w dr spencer at bedside  Awake alert toscano x 4 to commands  Stable numbness worst top left foot >1yr preop  [place] x 4 to commands  Pt ot in am  Pain mgmt  Pca and [place] dc in am  
supervision or assistance from another person for activity completion. Device may be needed.  Stand by assistance = pt requires verbal cues or instructions from another person, close to but not touching, to perform the activity  Minimal assistance= pt performs 75% or more of the activity; assistance is required to complete the activity  Moderate assistance= pt performs 50% of the activity; assistance is required to complete the activity  Maximal assistance = pt performs 25% of the activity; assistance is required to complete the activity  Dependent = pt requires total physical assistance to accomplish the task  
Mod IND in LB ADLs  - Be mod IND in ADL transfers without LOB  - Be Mod IND in toileting tasks    Patient Goal:    get better and go home    Discussed and agreed upon: Yes Comments:       Therapy Time:   Individual   Time In 1012   Time Out 1030   Minutes 18          Eval: 18 minutes     Electronically signed by:    LISA Shah/L,   5/18/2024, 11:22 AM

## (undated) DEVICE — COVER MICSCP W46XL120IN 4 BINOC GLS LENS LEICA

## (undated) DEVICE — SUTURE VICRYL RAPIDE SZ 3-0 L18IN ABSRB UD PS-2 L19MM 3/8 CIR VR497

## (undated) DEVICE — BLADE,CARBON-STEEL,15,STRL,DISPOSABLE,TB: Brand: MEDLINE

## (undated) DEVICE — NEEDLE NERVE STIM TROCAR PEDCL ACCS NAV PK

## (undated) DEVICE — SPONGE,NEURO,1"X3",XR,STRL,LF,10/PK: Brand: MEDLINE

## (undated) DEVICE — E-Z CLEAN, NON-STICK, PTFE COATED, ELECTROSURGICAL BLADE ELECTRODE, MODIFIED EXTENDED INSULATION, 4 INCH (10.2 CM): Brand: MEGADYNE

## (undated) DEVICE — KIT JACK TBL PT CARE

## (undated) DEVICE — GAUZE,SPONGE,4"X4",16PLY,XRAY,STRL,LF: Brand: MEDLINE

## (undated) DEVICE — SUTURE VICRYL + SZ 2-0 L18IN ABSRB UD CP-2 L26MM 1/2 CIR REV VCP762D

## (undated) DEVICE — LABEL MED MINI W/ MARKER

## (undated) DEVICE — Device

## (undated) DEVICE — GLOVE ORANGE PI 7 1/2   MSG9075

## (undated) DEVICE — CODMAN® SURGICAL PATTIES 1" X 1" (2.54CM X 2.54CM): Brand: CODMAN®

## (undated) DEVICE — 4-PORT MANIFOLD: Brand: NEPTUNE 2

## (undated) DEVICE — PIN, 9733235, 100MM, STERILE, PERC REF

## (undated) DEVICE — FLOSEAL WITH RECOTHROM - 10ML.: Brand: FLOSEAL HEMOSTATIC MATRIX

## (undated) DEVICE — LIQUIBAND RAPID ADHESIVE 36/CS 0.8ML: Brand: MEDLINE

## (undated) DEVICE — PIN 9733236 150MM STERILE PERC REF

## (undated) DEVICE — GAUZE,SPONGE,2"X2",8PLY,STERILE,LF,2'S: Brand: MEDLINE

## (undated) DEVICE — SUTURE PROL SZ 2-0 L30IN NONABSORBABLE BLU L26MM CT-1 1/2 8423H

## (undated) DEVICE — C-ARM: Brand: UNBRANDED

## (undated) DEVICE — X-LARGE COTTON GLOVE: Brand: DEROYAL

## (undated) DEVICE — GLOVE ORANGE PI 7   MSG9070

## (undated) DEVICE — TOTAL TRAY, DB, 100% SILI FOLEY, 16FR 10: Brand: MEDLINE

## (undated) DEVICE — 1.6MM NITINOL K-WIRE, 500MM, BLUNT TIP
Type: IMPLANTABLE DEVICE | Site: SPINE LUMBAR | Status: NON-FUNCTIONAL
Removed: 2024-05-17

## (undated) DEVICE — SURGICAL SUCTION CONNECTING TUBE WITH MALE CONNECTOR AND SUCTION CLAMP, 2 FT. LONG (.6 M), 5 MM I.D.: Brand: CONMED

## (undated) DEVICE — PEEK CANNULA A
Type: IMPLANTABLE DEVICE | Site: SPINE LUMBAR | Status: NON-FUNCTIONAL
Removed: 2024-05-17

## (undated) DEVICE — SHEET,DRAPE,53X77,STERILE: Brand: MEDLINE

## (undated) DEVICE — SUTURE VICRYL + SZ 0 L27IN ABSRB UD L36MM CT-1 1/2 CIR VCPP41D

## (undated) DEVICE — BLADE ES L6IN ELASTOMERIC COAT INSUL DURABLE BEND UPTO

## (undated) DEVICE — SPONGE,LAP,18"X18",DLX,XR,ST,5/PK,40/PK: Brand: MEDLINE

## (undated) DEVICE — 3.0MM PRECISION NEURO (MATCH HEAD)

## (undated) DEVICE — SUTURE VICRYL + SZ 30 L18IN ABSRB UD CP2 L26MM 1/2 CIR REV

## (undated) DEVICE — TOOL MR8-14MH30 MR8 14CM MATCH 3MM: Brand: MIDAS REX MR8

## (undated) DEVICE — MARKER SURG PASS SPHR NDI

## (undated) DEVICE — CONTAINER,SPECIMEN,OR STERILE,4OZ: Brand: MEDLINE

## (undated) DEVICE — SPONGE,NEURO,.75"X.75",XR,STRL,LF,10/PK: Brand: MEDLINE

## (undated) DEVICE — COVER,TABLE,44X90,STERILE: Brand: MEDLINE

## (undated) DEVICE — NEURO: Brand: MEDLINE INDUSTRIES, INC.

## (undated) DEVICE — BLADE ES ELASTOMERIC COAT INSUL DURABLE BEND UPTO 90DEG

## (undated) DEVICE — PEEK CANNULA B
Type: IMPLANTABLE DEVICE | Site: SPINE LUMBAR | Status: NON-FUNCTIONAL
Removed: 2024-05-17

## (undated) DEVICE — 1010 S-DRAPE TOWEL DRAPE 10/BX: Brand: STERI-DRAPE™

## (undated) DEVICE — SYRINGE MED 30ML STD CLR PLAS LUERLOCK TIP N CTRL DISP

## (undated) DEVICE — 3M™ TEGADERM™ TRANSPARENT FILM DRESSING FRAME STYLE, 1626W, 4 IN X 4-3/4 IN (10 CM X 12 CM), 50/CT 4CT/CASE: Brand: 3M™ TEGADERM™

## (undated) DEVICE — APPLICATOR MEDICATED 26 CC SOLUTION HI LT ORNG CHLORAPREP

## (undated) DEVICE — MARKER SURG SKIN GENTIAN VLT REG TIP W/ 6IN RUL DYNJSM01

## (undated) DEVICE — STERILE-Z SURGICAL PATIENT COVERS CLEAR POLYETHYLENE STERILE UNIVERSAL FIT 10 PER CASE: Brand: STERILE-Z

## (undated) DEVICE — TIDISHIELD O-ARM EQUIPMENT COVERS CLEAR POLYETHYLENE STERILE FITS MEDTRONIC O-ARM© 20 PER CASE: Brand: TIDISHIELD